# Patient Record
Sex: FEMALE | Race: WHITE | NOT HISPANIC OR LATINO | URBAN - METROPOLITAN AREA
[De-identification: names, ages, dates, MRNs, and addresses within clinical notes are randomized per-mention and may not be internally consistent; named-entity substitution may affect disease eponyms.]

---

## 2020-04-15 ENCOUNTER — TELEPHONE (OUTPATIENT)
Dept: ORTHOPEDICS | Facility: CLINIC | Age: 35
End: 2020-04-15

## 2020-04-15 DIAGNOSIS — M25.562 ACUTE PAIN OF LEFT KNEE: Primary | ICD-10-CM

## 2020-04-15 RX ORDER — HEPARIN SODIUM 5000 [USP'U]/ML
INJECTION, SOLUTION INTRAVENOUS; SUBCUTANEOUS
COMMUNITY
Start: 2020-02-26 | End: 2020-07-24

## 2020-04-15 RX ORDER — ENOXAPARIN SODIUM 100 MG/ML
INJECTION SUBCUTANEOUS
COMMUNITY
Start: 2020-03-23 | End: 2020-07-24

## 2020-04-15 NOTE — TELEPHONE ENCOUNTER
----- Message from Disha Thomson sent at 4/15/2020 11:30 AM CDT -----  Type: Needs appointment    Who Called:  Patient  Best Call Back Number: [phone]  Additional Information: Stated knee popped and gave out yesterday/patient is having trouble walking/needs appointment/please call back to schedule or advise.

## 2020-04-15 NOTE — TELEPHONE ENCOUNTER
Patient contacted. She is unable to WB, had 2 pops, then gave out completely. Left knee; no referral needed. Sheila Garibay LPN

## 2020-04-16 ENCOUNTER — OFFICE VISIT (OUTPATIENT)
Dept: ORTHOPEDICS | Facility: CLINIC | Age: 35
End: 2020-04-16
Payer: OTHER GOVERNMENT

## 2020-04-16 ENCOUNTER — HOSPITAL ENCOUNTER (OUTPATIENT)
Dept: RADIOLOGY | Facility: HOSPITAL | Age: 35
Discharge: HOME | End: 2020-04-16
Attending: ORTHOPAEDIC SURGERY
Payer: OTHER GOVERNMENT

## 2020-04-16 VITALS — RESPIRATION RATE: 16 BRPM

## 2020-04-16 DIAGNOSIS — M25.562 ACUTE PAIN OF LEFT KNEE: ICD-10-CM

## 2020-04-16 DIAGNOSIS — S83.412A SPRAIN OF MEDIAL COLLATERAL LIGAMENT OF LEFT KNEE, INITIAL ENCOUNTER: Primary | ICD-10-CM

## 2020-04-16 PROCEDURE — 73562 X-RAY EXAM OF KNEE 3: CPT | Mod: TC,PN,RT,59

## 2020-04-16 PROCEDURE — 73564 XR KNEE ORTHO LEFT WITH FLEXION: ICD-10-PCS | Mod: 26,LT,, | Performed by: RADIOLOGY

## 2020-04-16 PROCEDURE — 73562 X-RAY EXAM OF KNEE 3: CPT | Mod: 26,RT,, | Performed by: RADIOLOGY

## 2020-04-16 PROCEDURE — 73562 XR KNEE ORTHO LEFT WITH FLEXION: ICD-10-PCS | Mod: 26,RT,, | Performed by: RADIOLOGY

## 2020-04-16 PROCEDURE — 99212 OFFICE O/P EST SF 10 MIN: CPT | Mod: PBBFAC,25,PN | Performed by: ORTHOPAEDIC SURGERY

## 2020-04-16 PROCEDURE — 99999 PR PBB SHADOW E&M-EST. PATIENT-LVL II: CPT | Mod: PBBFAC,,, | Performed by: ORTHOPAEDIC SURGERY

## 2020-04-16 PROCEDURE — 99204 OFFICE O/P NEW MOD 45 MIN: CPT | Mod: S$PBB,,, | Performed by: ORTHOPAEDIC SURGERY

## 2020-04-16 PROCEDURE — 99204 PR OFFICE/OUTPT VISIT, NEW, LEVL IV, 45-59 MIN: ICD-10-PCS | Mod: S$PBB,,, | Performed by: ORTHOPAEDIC SURGERY

## 2020-04-16 PROCEDURE — 73564 X-RAY EXAM KNEE 4 OR MORE: CPT | Mod: 26,LT,, | Performed by: RADIOLOGY

## 2020-04-16 PROCEDURE — 99999 PR PBB SHADOW E&M-EST. PATIENT-LVL II: ICD-10-PCS | Mod: PBBFAC,,, | Performed by: ORTHOPAEDIC SURGERY

## 2020-04-16 RX ORDER — KETOROLAC TROMETHAMINE 10 MG/1
10 TABLET, FILM COATED ORAL EVERY 6 HOURS
Qty: 20 TABLET | Refills: 0 | Status: SHIPPED | OUTPATIENT
Start: 2020-04-16 | End: 2020-04-21

## 2020-04-16 NOTE — PROGRESS NOTES
CC:  34-year-old female presents for evaluation of left knee pain and swelling.  The patient states she is standing on her couch hanging something on the wall when her left foot slipped between the cushions.  She states that her knee pops twice and after that gave out.  She has had pain and difficulty bearing weight on the left leg since that time.  The pain localizes mostly of the medial aspect of her knee.  She rates the pain as an 8/10.    ROS:    Constitution: Denies chills, fever, and sweats.  HENT: Denies headaches or blurry vision.  Cardiovascular: Denies chest pain or irregular heart beat.  Respiratory: Denies cough or shortness of breath.  Gastrointestinal: Denies abdominal pain, nausea, or vomiting.  Genitourinary:  Denies urinary incontinence, bladder and kidney issues  Musculoskeletal:  Denies muscle cramps.  Positive for left knee pain  Neurological: Denies dizziness or focal weakness.  Psychiatric/Behavioral: Normal mental status.  Hematologic/Lymphatic: Denies bleeding problem or easy bruising/bleeding.  Skin: Denies rash or suspicious lesions.    Physical examination     Gen - No acute distress, well nourished, well groomed   Eyes - Extraoccular motions intact, pupils equally round and reactive to light and accommodation   ENT - normocephalic, atruamtic, oropharynx clear   Neck - Supple, no abnormal masses   Cardiovascular - regular rate and rhythm   Pulmonary - clear to auscultation bilaterally, no wheezes, ronchi, or rales   Abdomen - soft, non-tender, non-distended, positive bowel sounds   Psych - The patient is alert and oriented x3 with normal mood and affect    Examination of the Left Lower Extremity:     Motor function is intact distally EHL/FHL/TA/heavenly   +2 dorsalis pedis and posterior tibial pulses   Sensation to light touch intact distally dorsal, plantar, and first web space     Examination of the Left knee:    ROM 0 - 150   Effusion negative  Tenderness to palpation at the joint line  positive, exquisitely tender over the MCL  Pain during range of motion positive  Crepitation during range of motion negative     positive increased pain noted with flexion past 90   positive antalgic gait noted   negative Lachman's Test   negative Anterior Drawer Test   negative Posterior Drawer Test   negative McMurrays Test   negative Disco Test   negative Varus/Valgus instability, but medial pain is noted with valgus stress    X-ray images were examined and personally interpreted by me.  Joint space is well maintained.  There are no acute fractures.  No advanced arthritic signs    Dx:  Sprain of the MCL of the left knee    Plan:  We discussed different treatment options.  I discussed with the patient is probably going to take about 6 weeks for this to heal.  We did offer fitted with a brace and she agreed.  We did a 5 day prescription for Toradol to help with the acute pain.  Follow-up in 3 weeks for re-evaluation.

## 2020-04-17 ENCOUNTER — TELEPHONE (OUTPATIENT)
Dept: ORTHOPEDICS | Facility: CLINIC | Age: 35
End: 2020-04-17

## 2020-04-17 NOTE — TELEPHONE ENCOUNTER
----- Message from Tereza Juarez MA sent at 4/17/2020 11:45 AM CDT -----  Contact: self  Patient requesting to speak to nurse regarding she was prescribed ketorolac (TORADOL) 10 mg tablet and was informed by pharmacy that medication is not safe for breast feeding and patient is breast feeding a new born     And will like to go with next option       Patient contact is [phone] (home)

## 2020-04-17 NOTE — TELEPHONE ENCOUNTER
Patient contacted. Acetaminophen is the only option for breast feeding, as all NSAID's carry the same warning class. Sheila Garibay LPN

## 2020-07-22 DIAGNOSIS — G54.0 TOS (THORACIC OUTLET SYNDROME): Primary | ICD-10-CM

## 2020-07-22 NOTE — PROGRESS NOTES
VASCULAR SURGERY NOTE    Patient ID: Maria M Conner is a 35 y.o. female.    I. HISTORY     Chief Complaint: thoracic outlet syndrome    HPI: Maria M Conner is a 35 y.o. female who is here today for her initial visit regarding thoracic outlet syndrome. Patient initially suffered from a DVT when she was 18yo in her right subclavian vein. 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 She had work up for hypercoagulable disorder and eventually had venogram about a year later and had right trans-axillary first rib resection. Despite this surgical intervention she apparently had thrombosis of her right subclavian vein post-op and her symptoms returned in the PACU. Since that time she has had mild-moderate swelling and bluish discoloration of her right arm whenever she performs strenuous activities. She denies any neurological or arterial symptoms. She is here today and remarks she has continued swelling and discoloration with strenuous right arm exertion. She denies any significant pain, numbness, or paresthesias in her left arm.  Denies history of trauma and does not take any anticoagulation and has not taken it for several years. She denies swelling in her right arm at baseline.    Medical history of Venous TOS    Surgical history of right first rib resection.      Social History     Tobacco Use   Smoking Status Never Smoker   Smokeless Tobacco Never Used        Review of Systems   Constitution: Negative for chills and fever.   HENT: Negative for ear pain, hoarse voice and sore throat.    Eyes: Negative for discharge, pain, vision loss in left eye and vision loss in right eye.   Cardiovascular: Negative for chest pain, cyanosis and palpitations.   Respiratory: Negative for cough, hemoptysis and shortness of breath.    Endocrine: Negative for polydipsia and polyuria.   Skin: Positive for color change. Negative for dry skin and rash.   Musculoskeletal: Negative for back pain, neck pain and stiffness.        Arm swelling   Gastrointestinal:  Negative for abdominal pain, diarrhea, nausea and vomiting.   Genitourinary: Negative for dysuria and hematuria.   Neurological: Negative for brief paralysis, dizziness and paresthesias.   Psychiatric/Behavioral: Negative for depression.   Allergic/Immunologic: Negative for hives.         II. PHYSICAL EXAM     Physical Exam   Constitutional: She appears well-developed and well-nourished. No distress.   HENT:   Head: Normocephalic and atraumatic.   Eyes: Conjunctivae are normal.   Neck: Normal range of motion. Neck supple.   Cardiovascular: Normal rate.   Pulmonary/Chest: Effort normal.   Abdominal: Soft. There is no abdominal tenderness.   Musculoskeletal: Normal range of motion.         General: No edema.   Neurological: She is alert.   Skin: Skin is warm and dry. No rash noted.   Psychiatric: She has a normal mood and affect. Her behavior is normal.   Nursing note and vitals reviewed.  Left radial pulse 2+ at rest, 2+ when abducted to level of shoulder, absent when raised overhead. Patient also remarks on some right hand paresthesias during this maneuver.     QuickDash Score: 22.7      III. ASSESSMENT & PLAN (MEDICAL DECISION MAKING)     1. Venous thoracic outlet syndrome of right subclavian vein        Imaging Results:  CXR: resected first right rib. Cervical rib left.       Assessment/Diagnosis and Plan:  35 y.o. female with h/o right sided VTOS and right first rib resection years ago. No symptoms at rest. + swelling/discoloration with strenuous activity. Discussed treatment options: Overall risks/morbidity of open venous reconstruction significantly outweigh the benefits due to limited short term patency. Discussed limited long term patency of endovascular recanalization as well. Prognosis of her current situation is good and she should not expect any worsening of her symptoms. Venous collateralization should continue to improve. No indication for anticoagulation. She has a cervical rib on the right but no  signs/symptoms of venous, arterial, or neurogenic TOS.  Extensive discussion was had regarding the nature of this disease process. Instructed her to return for re-evaluation if her symptoms worsen or if she experiences arterial or neurological symptoms.     -return to clinic JOSÉ MIGUEL Bernard II, MD, [facility]  Vascular Surgeon  [facility]

## 2020-07-24 ENCOUNTER — INITIAL CONSULT (OUTPATIENT)
Dept: VASCULAR SURGERY | Facility: CLINIC | Age: 35
End: 2020-07-24
Attending: SURGERY
Payer: OTHER GOVERNMENT

## 2020-07-24 ENCOUNTER — HOSPITAL ENCOUNTER (OUTPATIENT)
Dept: RADIOLOGY | Facility: HOSPITAL | Age: 35
Discharge: HOME | End: 2020-07-24
Attending: SURGERY
Payer: OTHER GOVERNMENT

## 2020-07-24 VITALS
HEART RATE: 101 BPM | DIASTOLIC BLOOD PRESSURE: 78 MMHG | HEIGHT: 65 IN | TEMPERATURE: 99 F | SYSTOLIC BLOOD PRESSURE: 140 MMHG | BODY MASS INDEX: 48.82 KG/M2 | WEIGHT: 293 LBS

## 2020-07-24 DIAGNOSIS — I87.1 VENOUS THORACIC OUTLET SYNDROME OF RIGHT SUBCLAVIAN VEIN: Primary | ICD-10-CM

## 2020-07-24 DIAGNOSIS — G54.0 TOS (THORACIC OUTLET SYNDROME): ICD-10-CM

## 2020-07-24 PROCEDURE — 99202 PR OFFICE/OUTPT VISIT, NEW, LEVL II, 15-29 MIN: ICD-10-PCS | Mod: S$PBB,,, | Performed by: SURGERY

## 2020-07-24 PROCEDURE — 71046 X-RAY EXAM CHEST 2 VIEWS: CPT | Mod: 26,,, | Performed by: RADIOLOGY

## 2020-07-24 PROCEDURE — 71046 XR CHEST PA AND LATERAL: ICD-10-PCS | Mod: 26,,, | Performed by: RADIOLOGY

## 2020-07-24 PROCEDURE — 99213 OFFICE O/P EST LOW 20 MIN: CPT | Mod: PBBFAC,25 | Performed by: SURGERY

## 2020-07-24 PROCEDURE — 99202 OFFICE O/P NEW SF 15 MIN: CPT | Mod: S$PBB,,, | Performed by: SURGERY

## 2020-07-24 PROCEDURE — 99999 PR PBB SHADOW E&M-EST. PATIENT-LVL III: ICD-10-PCS | Mod: PBBFAC,,, | Performed by: SURGERY

## 2020-07-24 PROCEDURE — 71046 X-RAY EXAM CHEST 2 VIEWS: CPT | Mod: TC

## 2020-07-24 PROCEDURE — 99999 PR PBB SHADOW E&M-EST. PATIENT-LVL III: CPT | Mod: PBBFAC,,, | Performed by: SURGERY

## 2021-03-24 ENCOUNTER — OFFICE VISIT (OUTPATIENT)
Dept: DERMATOLOGY | Facility: CLINIC | Age: 36
End: 2021-03-24
Payer: OTHER GOVERNMENT

## 2021-03-24 DIAGNOSIS — L91.8 SKIN TAG: Primary | ICD-10-CM

## 2021-03-24 DIAGNOSIS — L85.8 KERATOSIS PILARIS: ICD-10-CM

## 2021-03-24 DIAGNOSIS — I78.1 SPIDER ANGIOMA: ICD-10-CM

## 2021-03-24 DIAGNOSIS — L30.9 HAND DERMATITIS: ICD-10-CM

## 2021-03-24 DIAGNOSIS — R22.9 SUBCUTANEOUS NODULE: ICD-10-CM

## 2021-03-24 DIAGNOSIS — D22.9 BENIGN NEVUS: ICD-10-CM

## 2021-03-24 DIAGNOSIS — D23.9 DERMATOFIBROMA: ICD-10-CM

## 2021-03-24 PROCEDURE — 99212 OFFICE O/P EST SF 10 MIN: CPT | Mod: PBBFAC,PO | Performed by: STUDENT IN AN ORGANIZED HEALTH CARE EDUCATION/TRAINING PROGRAM

## 2021-03-24 PROCEDURE — 99204 OFFICE O/P NEW MOD 45 MIN: CPT | Mod: S$PBB,,, | Performed by: STUDENT IN AN ORGANIZED HEALTH CARE EDUCATION/TRAINING PROGRAM

## 2021-03-24 PROCEDURE — 99204 PR OFFICE/OUTPT VISIT, NEW, LEVL IV, 45-59 MIN: ICD-10-PCS | Mod: S$PBB,,, | Performed by: STUDENT IN AN ORGANIZED HEALTH CARE EDUCATION/TRAINING PROGRAM

## 2021-03-24 PROCEDURE — 99999 PR PBB SHADOW E&M-EST. PATIENT-LVL II: CPT | Mod: PBBFAC,,, | Performed by: STUDENT IN AN ORGANIZED HEALTH CARE EDUCATION/TRAINING PROGRAM

## 2021-03-24 PROCEDURE — 99999 PR PBB SHADOW E&M-EST. PATIENT-LVL II: ICD-10-PCS | Mod: PBBFAC,,, | Performed by: STUDENT IN AN ORGANIZED HEALTH CARE EDUCATION/TRAINING PROGRAM

## 2022-05-18 PROBLEM — Z34.90 TERM PREGNANCY: Status: ACTIVE | Noted: 2022-05-18

## 2022-05-18 PROBLEM — Z98.891 H/O CESAREAN SECTION: Status: ACTIVE | Noted: 2022-05-18
